# Patient Record
Sex: MALE | Race: WHITE | HISPANIC OR LATINO | Employment: FULL TIME | URBAN - METROPOLITAN AREA
[De-identification: names, ages, dates, MRNs, and addresses within clinical notes are randomized per-mention and may not be internally consistent; named-entity substitution may affect disease eponyms.]

---

## 2020-04-03 ENCOUNTER — APPOINTMENT (OUTPATIENT)
Dept: GENERAL RADIOLOGY | Facility: HOSPITAL | Age: 30
End: 2020-04-03

## 2020-04-03 ENCOUNTER — HOSPITAL ENCOUNTER (EMERGENCY)
Facility: HOSPITAL | Age: 30
Discharge: HOME | End: 2020-04-03
Attending: EMERGENCY MEDICINE

## 2020-04-03 VITALS
RESPIRATION RATE: 18 BRPM | TEMPERATURE: 98.6 F | SYSTOLIC BLOOD PRESSURE: 135 MMHG | HEART RATE: 84 BPM | HEIGHT: 69 IN | WEIGHT: 250 LBS | BODY MASS INDEX: 37.03 KG/M2 | OXYGEN SATURATION: 96 % | DIASTOLIC BLOOD PRESSURE: 90 MMHG
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
## 2020-04-03 PROCEDURE — 73140 X-RAY EXAM OF FINGER(S): CPT

## 2020-04-03 PROCEDURE — 99283 EMERGENCY DEPT VISIT LOW MDM: CPT

## 2020-04-03 RX ORDER — AMOXICILLIN AND CLAVULANATE POTASSIUM 875; 125 MG/1; MG/1
1 TABLET, FILM COATED ORAL ONCE
Status: COMPLETED | OUTPATIENT
Start: 2020-04-03 | End: 2020-04-03

## 2020-04-03 RX ORDER — AMOXICILLIN AND CLAVULANATE POTASSIUM 875; 125 MG/1; MG/1
1 TABLET, FILM COATED ORAL EVERY 12 HOURS
Qty: 14 TABLET | Refills: 0 | Status: SHIPPED | OUTPATIENT
Start: 2020-04-03

## 2020-04-03 RX ADMIN — AMOXICILLIN AND CLAVULANATE POTASSIUM 1 TABLET: 875; 125 TABLET, FILM COATED ORAL at 17:01

## 2020-04-03 NOTE — ED PROVIDER NOTES
Subjective   Jeramie Perez is a 29 y.o. male who presents to the ED with complaints of an animal bite. He reports reports that he got his left thumb bitten by a stallion earlier today causing a cut. He also reports getting bitten on the right wrist. These areas are swollen and red. He is right handed. He has hepatitis C. He denies IV drug use. His tetanus status is unknown. He denies chest pain, a cough, or shortness of breath but does mention a mild sore throat. There are no other acute complaints at this time.      History provided by:  Patient  Animal Bite   Attacking animal: horse.  Location:  Hand  Hand injury location:  L hand and R wrist  Pain details:     Severity:  Mild    Timing:  Constant    Progression:  Unchanged  Incident location:  Outside  Relieved by:  None tried  Ineffective treatments:  None tried  Associated symptoms: swelling    Associated symptoms: no fever and no numbness        Review of Systems   Constitutional: Negative for fever.   HENT: Positive for sore throat.    Respiratory: Negative for cough, chest tightness and shortness of breath.    Cardiovascular: Negative for chest pain.   Gastrointestinal: Negative for nausea.   Musculoskeletal: Positive for arthralgias (left thumb, right wrist).   Skin: Positive for color change (redness) and wound.   Allergic/Immunologic: Negative for immunocompromised state.   Neurological: Negative for numbness.   Hematological: Negative.    Psychiatric/Behavioral: Negative.    All other systems reviewed and are negative.      History reviewed. No pertinent past medical history.    No Known Allergies    History reviewed. No pertinent surgical history.    History reviewed. No pertinent family history.    Social History     Socioeconomic History   • Marital status: Significant Other     Spouse name: Not on file   • Number of children: Not on file   • Years of education: Not on file   • Highest education level: Not on file   Tobacco Use   • Smoking status: Light  Tobacco Smoker   • Smokeless tobacco: Never Used   Substance and Sexual Activity   • Alcohol use: Yes     Comment: OCASSIONALLY    • Drug use: Never   • Sexual activity: Defer         Objective   Physical Exam   Constitutional: He is oriented to person, place, and time. He appears well-developed and well-nourished. No distress.   HENT:   Head: Normocephalic and atraumatic.   Nose: Nose normal.   Mouth/Throat: Oropharynx is clear and moist. No oropharyngeal exudate or posterior oropharyngeal erythema.   No pharyngeal erythema or exudate.   Eyes: Pupils are equal, round, and reactive to light. Conjunctivae are normal. No scleral icterus.   Neck: Normal range of motion. Neck supple.   Cardiovascular: Normal rate, regular rhythm, normal heart sounds and intact distal pulses.   No murmur heard.  Regular rate and rhythm. No murmur.   Pulmonary/Chest: Effort normal and breath sounds normal. No respiratory distress.   Lungs clear.   Abdominal: Soft. There is no tenderness.   Musculoskeletal: He exhibits edema and tenderness.        Left wrist: He exhibits tenderness and swelling.   Superficial skin avulsion to the dorsal radial aspect of the left thumb with swelling and bruising noted. Swelling and tenderness in the left thenar eminence. The right dorsal radial wrist has some swelling and tenderness with no skin breakage. Normal range of motion to the digits on the right.   Neurological: He is alert and oriented to person, place, and time.   Skin: Skin is warm and dry. There is erythema.   Psychiatric: He has a normal mood and affect. His behavior is normal.   Nursing note and vitals reviewed.      Procedures         ED Course    Xrays shows no evidence of fracture or dislocation.  Wounds cleansed with saline and betadine.  Will have RN dress wounds and will d/c on abx and f/u with ortho.  I spoke with Dr. Rueda by phone and he advised to have pt call the office number to arrange for follow up on Monday.      ED Course as  "of Apr 03 1630   Fri Apr 03, 2020   1518 COVID-19 RISK SCREEN    Has the patient had close contact without PPE with a lab confirmed COVID-19 (+) person or a person under investigation (PUI) for COVID-19 infection?  -- No     Has the patient provided care or had close contact with COVID-19(+) patient in a healthcare facility? --  No    [AA]      ED Course User Index  [AA] Hernandez Aburto     No results found for this or any previous visit (from the past 24 hour(s)).  Note: In addition to lab results from this visit, the labs listed above may include labs taken at another facility or during a different encounter within the last 24 hours. Please correlate lab times with ED admission and discharge times for further clarification of the services performed during this visit.    XR Finger 2+ View Left   Preliminary Result   Soft tissue swelling with no evidence of acute bony   abnormality.                Vitals:    04/03/20 1517 04/03/20 1523 04/03/20 1615   BP: 135/90     BP Location: Right arm     Patient Position: Lying     Pulse:  84 84   Resp: 18     Temp: 98.6 °F (37 °C)     TempSrc: Oral     SpO2: 98%  96%   Weight: 113 kg (250 lb)     Height: 175.3 cm (69\")       Medications   Tdap (BOOSTRIX) injection 0.5 mL (has no administration in time range)   amoxicillin-clavulanate (AUGMENTIN) 875-125 MG per tablet 1 tablet (has no administration in time range)     ECG/EMG Results (last 24 hours)     ** No results found for the last 24 hours. **        No orders to display                  No results found for this or any previous visit (from the past 24 hour(s)).  Note: In addition to lab results from this visit, the labs listed above may include labs taken at another facility or during a different encounter within the last 24 hours. Please correlate lab times with ED admission and discharge times for further clarification of the services performed during this visit.    XR Finger 2+ View Left   Preliminary Result   Soft " "tissue swelling with no evidence of acute bony   abnormality.                Vitals:    04/03/20 1517 04/03/20 1523 04/03/20 1615   BP: 135/90     BP Location: Right arm     Patient Position: Lying     Pulse:  84 84   Resp: 18     Temp: 98.6 °F (37 °C)     TempSrc: Oral     SpO2: 98%  96%   Weight: 113 kg (250 lb)     Height: 175.3 cm (69\")       Medications   Tdap (BOOSTRIX) injection 0.5 mL (has no administration in time range)   amoxicillin-clavulanate (AUGMENTIN) 875-125 MG per tablet 1 tablet (has no administration in time range)     ECG/EMG Results (last 24 hours)     ** No results found for the last 24 hours. **        No orders to display                                      MDM    Final diagnoses:   Horse bite, initial encounter       Documentation assistance provided by fernando Aburto.  Information recorded by the scribe was done at my direction and has been verified and validated by me.     Hernandez Aburto  04/03/20 1606       Emiliano Carrera PA  04/03/20 1629       Emiliano Carrera PA  04/03/20 1630    "

## 2020-04-03 NOTE — DISCHARGE INSTRUCTIONS
Keep wound clean.  Change dressing daily.  Augmentin as prescribed.  You may take Tylenol or Motrin as directed for pain.  Call Dr. Bright's office tomorrow for time to be seen on Monday.  Return to ER if worse.  Apply ice bag off/on as needed and elevate the hand as able.